# Patient Record
Sex: FEMALE | Race: WHITE | NOT HISPANIC OR LATINO | Employment: UNEMPLOYED | URBAN - METROPOLITAN AREA
[De-identification: names, ages, dates, MRNs, and addresses within clinical notes are randomized per-mention and may not be internally consistent; named-entity substitution may affect disease eponyms.]

---

## 2018-07-14 ENCOUNTER — APPOINTMENT (OUTPATIENT)
Dept: CT IMAGING | Facility: HOSPITAL | Age: 38
End: 2018-07-14

## 2018-07-14 ENCOUNTER — HOSPITAL ENCOUNTER (EMERGENCY)
Facility: HOSPITAL | Age: 38
Discharge: HOME | End: 2018-07-14
Attending: EMERGENCY MEDICINE | Admitting: EMERGENCY MEDICINE

## 2018-07-14 VITALS
BODY MASS INDEX: 19.12 KG/M2 | HEIGHT: 64 IN | DIASTOLIC BLOOD PRESSURE: 96 MMHG | SYSTOLIC BLOOD PRESSURE: 130 MMHG | OXYGEN SATURATION: 100 % | TEMPERATURE: 98 F | HEART RATE: 89 BPM | RESPIRATION RATE: 17 BRPM | WEIGHT: 112 LBS

## 2018-07-14 DIAGNOSIS — K61.0 PERIANAL CELLULITIS: Primary | ICD-10-CM

## 2018-07-14 LAB
ALBUMIN SERPL-MCNC: 4.3 G/DL (ref 3.5–5.2)
ALBUMIN/GLOB SERPL: 1.7 G/DL
ALP SERPL-CCNC: 96 U/L (ref 39–117)
ALT SERPL W P-5'-P-CCNC: 16 U/L (ref 1–33)
ANION GAP SERPL CALCULATED.3IONS-SCNC: 14.5 MMOL/L
AST SERPL-CCNC: 20 U/L (ref 1–32)
BACTERIA UR QL AUTO: ABNORMAL /HPF
BASOPHILS # BLD AUTO: 0.02 10*3/MM3 (ref 0–0.2)
BASOPHILS NFR BLD AUTO: 0.1 % (ref 0–1.5)
BILIRUB SERPL-MCNC: 0.5 MG/DL (ref 0.1–1.2)
BILIRUB UR QL STRIP: NEGATIVE
BUN BLD-MCNC: 8 MG/DL (ref 6–20)
BUN/CREAT SERPL: 10.5 (ref 7–25)
CALCIUM SPEC-SCNC: 9.4 MG/DL (ref 8.6–10.5)
CHLORIDE SERPL-SCNC: 103 MMOL/L (ref 98–107)
CLARITY UR: CLEAR
CO2 SERPL-SCNC: 25.5 MMOL/L (ref 22–29)
COLOR UR: ABNORMAL
CREAT BLD-MCNC: 0.76 MG/DL (ref 0.57–1)
D-LACTATE SERPL-SCNC: 0.7 MMOL/L (ref 0.5–2)
DEPRECATED RDW RBC AUTO: 42.8 FL (ref 37–54)
EOSINOPHIL # BLD AUTO: 0.07 10*3/MM3 (ref 0–0.7)
EOSINOPHIL NFR BLD AUTO: 0.5 % (ref 0.3–6.2)
ERYTHROCYTE [DISTWIDTH] IN BLOOD BY AUTOMATED COUNT: 12.7 % (ref 11.7–13)
GFR SERPL CREATININE-BSD FRML MDRD: 85 ML/MIN/1.73
GLOBULIN UR ELPH-MCNC: 2.5 GM/DL
GLUCOSE BLD-MCNC: 70 MG/DL (ref 65–99)
GLUCOSE UR STRIP-MCNC: NEGATIVE MG/DL
HCG SERPL QL: NEGATIVE
HCT VFR BLD AUTO: 41.1 % (ref 35.6–45.5)
HGB BLD-MCNC: 13.1 G/DL (ref 11.9–15.5)
HGB UR QL STRIP.AUTO: ABNORMAL
HYALINE CASTS UR QL AUTO: ABNORMAL /LPF
IMM GRANULOCYTES # BLD: 0.04 10*3/MM3 (ref 0–0.03)
IMM GRANULOCYTES NFR BLD: 0.3 % (ref 0–0.5)
KETONES UR QL STRIP: ABNORMAL
LEUKOCYTE ESTERASE UR QL STRIP.AUTO: NEGATIVE
LYMPHOCYTES # BLD AUTO: 1.56 10*3/MM3 (ref 0.9–4.8)
LYMPHOCYTES NFR BLD AUTO: 11 % (ref 19.6–45.3)
MCH RBC QN AUTO: 29.6 PG (ref 26.9–32)
MCHC RBC AUTO-ENTMCNC: 31.9 G/DL (ref 32.4–36.3)
MCV RBC AUTO: 93 FL (ref 80.5–98.2)
MONOCYTES # BLD AUTO: 0.97 10*3/MM3 (ref 0.2–1.2)
MONOCYTES NFR BLD AUTO: 6.8 % (ref 5–12)
NEUTROPHILS # BLD AUTO: 11.56 10*3/MM3 (ref 1.9–8.1)
NEUTROPHILS NFR BLD AUTO: 81.3 % (ref 42.7–76)
NITRITE UR QL STRIP: NEGATIVE
PH UR STRIP.AUTO: 6.5 [PH] (ref 5–8)
PLATELET # BLD AUTO: 213 10*3/MM3 (ref 140–500)
PMV BLD AUTO: 10.4 FL (ref 6–12)
POTASSIUM BLD-SCNC: 4.3 MMOL/L (ref 3.5–5.2)
PROT SERPL-MCNC: 6.8 G/DL (ref 6–8.5)
PROT UR QL STRIP: NEGATIVE
RBC # BLD AUTO: 4.42 10*6/MM3 (ref 3.9–5.2)
RBC # UR: ABNORMAL /HPF
REF LAB TEST METHOD: ABNORMAL
SODIUM BLD-SCNC: 143 MMOL/L (ref 136–145)
SP GR UR STRIP: 1.03 (ref 1–1.03)
SQUAMOUS #/AREA URNS HPF: ABNORMAL /HPF
UROBILINOGEN UR QL STRIP: ABNORMAL
WBC NRBC COR # BLD: 14.22 10*3/MM3 (ref 4.5–10.7)
WBC UR QL AUTO: ABNORMAL /HPF

## 2018-07-14 PROCEDURE — 25010000002 IOPAMIDOL 61 % SOLUTION: Performed by: EMERGENCY MEDICINE

## 2018-07-14 PROCEDURE — 96375 TX/PRO/DX INJ NEW DRUG ADDON: CPT

## 2018-07-14 PROCEDURE — 96365 THER/PROPH/DIAG IV INF INIT: CPT

## 2018-07-14 PROCEDURE — 25010000002 HYDROMORPHONE PER 4 MG: Performed by: EMERGENCY MEDICINE

## 2018-07-14 PROCEDURE — 87040 BLOOD CULTURE FOR BACTERIA: CPT | Performed by: EMERGENCY MEDICINE

## 2018-07-14 PROCEDURE — 81001 URINALYSIS AUTO W/SCOPE: CPT | Performed by: EMERGENCY MEDICINE

## 2018-07-14 PROCEDURE — 25010000002 PIPERACILLIN SOD-TAZOBACTAM PER 1 G: Performed by: EMERGENCY MEDICINE

## 2018-07-14 PROCEDURE — 99284 EMERGENCY DEPT VISIT MOD MDM: CPT

## 2018-07-14 PROCEDURE — 84703 CHORIONIC GONADOTROPIN ASSAY: CPT | Performed by: EMERGENCY MEDICINE

## 2018-07-14 PROCEDURE — 25010000002 ONDANSETRON PER 1 MG: Performed by: EMERGENCY MEDICINE

## 2018-07-14 PROCEDURE — 96361 HYDRATE IV INFUSION ADD-ON: CPT

## 2018-07-14 PROCEDURE — 74177 CT ABD & PELVIS W/CONTRAST: CPT

## 2018-07-14 PROCEDURE — 83605 ASSAY OF LACTIC ACID: CPT | Performed by: EMERGENCY MEDICINE

## 2018-07-14 PROCEDURE — 80053 COMPREHEN METABOLIC PANEL: CPT | Performed by: EMERGENCY MEDICINE

## 2018-07-14 PROCEDURE — 85025 COMPLETE CBC W/AUTO DIFF WBC: CPT | Performed by: EMERGENCY MEDICINE

## 2018-07-14 RX ORDER — ONDANSETRON 4 MG/1
4 TABLET, FILM COATED ORAL EVERY 6 HOURS PRN
Qty: 12 TABLET | Refills: 0 | Status: SHIPPED | OUTPATIENT
Start: 2018-07-14

## 2018-07-14 RX ORDER — SODIUM CHLORIDE 0.9 % (FLUSH) 0.9 %
10 SYRINGE (ML) INJECTION AS NEEDED
Status: DISCONTINUED | OUTPATIENT
Start: 2018-07-14 | End: 2018-07-14 | Stop reason: HOSPADM

## 2018-07-14 RX ORDER — CEPHALEXIN 500 MG/1
500 CAPSULE ORAL ONCE
Status: DISCONTINUED | OUTPATIENT
Start: 2018-07-14 | End: 2018-07-14

## 2018-07-14 RX ORDER — ONDANSETRON 2 MG/ML
4 INJECTION INTRAMUSCULAR; INTRAVENOUS ONCE
Status: COMPLETED | OUTPATIENT
Start: 2018-07-14 | End: 2018-07-14

## 2018-07-14 RX ORDER — AMOXICILLIN AND CLAVULANATE POTASSIUM 875; 125 MG/1; MG/1
1 TABLET, FILM COATED ORAL EVERY 12 HOURS
Qty: 14 TABLET | Refills: 0 | Status: SHIPPED | OUTPATIENT
Start: 2018-07-14

## 2018-07-14 RX ORDER — PRENATAL VIT NO.126/IRON/FOLIC 28MG-0.8MG
TABLET ORAL DAILY
COMMUNITY

## 2018-07-14 RX ORDER — HYDROCODONE BITARTRATE AND ACETAMINOPHEN 5; 325 MG/1; MG/1
1 TABLET ORAL EVERY 6 HOURS PRN
Qty: 12 TABLET | Refills: 0 | Status: SHIPPED | OUTPATIENT
Start: 2018-07-14

## 2018-07-14 RX ORDER — HYDROMORPHONE HYDROCHLORIDE 1 MG/ML
0.5 INJECTION, SOLUTION INTRAMUSCULAR; INTRAVENOUS; SUBCUTANEOUS ONCE
Status: COMPLETED | OUTPATIENT
Start: 2018-07-14 | End: 2018-07-14

## 2018-07-14 RX ORDER — METRONIDAZOLE 500 MG/1
500 TABLET ORAL ONCE
Status: DISCONTINUED | OUTPATIENT
Start: 2018-07-14 | End: 2018-07-14

## 2018-07-14 RX ORDER — SACCHAROMYCES BOULARDII 250 MG
250 CAPSULE ORAL 2 TIMES DAILY
COMMUNITY

## 2018-07-14 RX ADMIN — SODIUM CHLORIDE 1000 ML: 9 INJECTION, SOLUTION INTRAVENOUS at 16:01

## 2018-07-14 RX ADMIN — IOPAMIDOL 85 ML: 612 INJECTION, SOLUTION INTRAVENOUS at 17:28

## 2018-07-14 RX ADMIN — HYDROMORPHONE HYDROCHLORIDE 0.5 MG: 1 INJECTION, SOLUTION INTRAMUSCULAR; INTRAVENOUS; SUBCUTANEOUS at 15:56

## 2018-07-14 RX ADMIN — ONDANSETRON 4 MG: 2 INJECTION INTRAMUSCULAR; INTRAVENOUS at 15:51

## 2018-07-14 RX ADMIN — TAZOBACTAM SODIUM AND PIPERACILLIN SODIUM 4.5 G: 500; 4 INJECTION, SOLUTION INTRAVENOUS at 18:41

## 2018-07-14 NOTE — DISCHARGE INSTRUCTIONS
Take medications as prescribed.  Take a sitz bath several times daily.  Follow-up with Dr. Padilla in 2 days.  Return to the emergency Department for persistent fever, worsening pain, vomiting, or other concern.

## 2018-07-14 NOTE — ED PROVIDER NOTES
" EMERGENCY DEPARTMENT ENCOUNTER    CHIEF COMPLAINT  Chief Complaint: Rectal pain  History given by: Patient  History limited by: None  Room Number: 11/11  PMD: No Known Provider      HPI:  Pt is a 37 y.o. female who presents complaining of worsened \"throbbing\" rectal pain since 2 days ago. Pt also c/o flu like symptoms that began yesterday including fever, nausea, one episode of vomiting, decreased appetite, and chils, as well as SOA, slow urine output, and intermittent HA, but denies cough, dysuria, sore throat, and hematochezia. Pt took Naproxen this AM. Pt surgical hx includes appendectomy, breast biopsy, and D&C.     Duration:  Since 2 days ago  Onset: Gradual  Timing: Constant  Location: Rectum  Radiation: None  Quality: \"throbbing\" pain  Intensity/Severity: Moderate  Progression: Worsened  Associated Symptoms: Fever, nausea, one episode of vomiting, decreased appetite, chills, SOA, slow urine output, and intermittent HA  Previous Episodes: None  Treatment before arrival: Pt took Naproxen this AM.    PAST MEDICAL HISTORY  Active Ambulatory Problems     Diagnosis Date Noted   • No Active Ambulatory Problems     Resolved Ambulatory Problems     Diagnosis Date Noted   • No Resolved Ambulatory Problems     No Additional Past Medical History       PAST SURGICAL HISTORY  Past Surgical History:   Procedure Laterality Date   • APPENDECTOMY     • BREAST BIOPSY     • DILATATION AND CURETTAGE         FAMILY HISTORY  History reviewed. No pertinent family history.    SOCIAL HISTORY  Social History     Social History   • Marital status:      Spouse name: N/A   • Number of children: N/A   • Years of education: N/A     Occupational History   • Not on file.     Social History Main Topics   • Smoking status: Never Smoker   • Smokeless tobacco: Not on file   • Alcohol use Yes      Comment: occasionally   • Drug use: No   • Sexual activity: Yes     Partners: Male     Other Topics Concern   • Not on file     Social History " "Narrative   • No narrative on file       ALLERGIES  Patient has no known allergies.    REVIEW OF SYSTEMS  Review of Systems   Constitutional: Positive for appetite change (decreased), chills and fever.   HENT: Negative for sore throat.    Eyes: Negative.    Respiratory: Positive for shortness of breath. Negative for cough.    Cardiovascular: Negative for chest pain.   Gastrointestinal: Positive for nausea, rectal pain (worsened \"throbbing\" pain) and vomiting (one episode). Negative for abdominal pain, blood in stool and diarrhea.   Genitourinary: Positive for difficulty urinating (slow urine output). Negative for dysuria.   Musculoskeletal: Negative for neck pain.   Skin: Negative for rash.   Allergic/Immunologic: Negative.    Neurological: Positive for headaches (intermittent). Negative for weakness and numbness.   Hematological: Negative.    Psychiatric/Behavioral: Negative.    All other systems reviewed and are negative.      PHYSICAL EXAM  ED Triage Vitals [07/14/18 1331]   Temp Heart Rate Resp BP SpO2   99.9 °F (37.7 °C) 89 18 152/97 98 %      Temp src Heart Rate Source Patient Position BP Location FiO2 (%)   Tympanic Monitor -- -- --       Physical Exam   Constitutional: She is oriented to person, place, and time.  Non-toxic appearance. No distress.   HENT:   Head: Normocephalic and atraumatic.   Eyes: EOM are normal. Pupils are equal, round, and reactive to light.   Neck: Normal range of motion. Neck supple.   Cardiovascular: Normal rate, regular rhythm and normal heart sounds.    Pulmonary/Chest: Effort normal and breath sounds normal. No respiratory distress.   Abdominal: Soft. There is no tenderness. There is no rebound and no guarding.   Genitourinary: Rectal exam shows no external hemorrhoid and no internal hemorrhoid.   Genitourinary Comments: Upon rectal exam, pt has no hemorrhoids, tenderness between rectum and posterior perineum, and no fluctuance.   Musculoskeletal: Normal range of motion. She " exhibits no edema.   Neurological: She is alert and oriented to person, place, and time. She has normal sensation and normal strength.   Skin: Skin is warm and dry. No rash noted.   Psychiatric: Mood and affect normal.   Nursing note and vitals reviewed.      LAB RESULTS  Lab Results (last 24 hours)     Procedure Component Value Units Date/Time    CBC & Differential [[id number]] Collected:  07/14/18 1547    Specimen:  Blood Updated:  07/14/18 1620    Narrative:       The following orders were created for panel order CBC & Differential.  Procedure                               Abnormality         Status                     ---------                               -----------         ------                     CBC Auto Differential[[id number]]        Abnormal            Final result                 Please view results for these tests on the individual orders.    Comprehensive Metabolic Panel [[id number]] Collected:  07/14/18 1547    Specimen:  Blood Updated:  07/14/18 1635     Glucose 70 mg/dL      BUN 8 mg/dL      Creatinine 0.76 mg/dL      Sodium 143 mmol/L      Potassium 4.3 mmol/L      Chloride 103 mmol/L      CO2 25.5 mmol/L      Calcium 9.4 mg/dL      Total Protein 6.8 g/dL      Albumin 4.30 g/dL      ALT (SGPT) 16 U/L      AST (SGOT) 20 U/L      Alkaline Phosphatase 96 U/L      Total Bilirubin 0.5 mg/dL      eGFR Non African Amer 85 mL/min/1.73      Globulin 2.5 gm/dL      A/G Ratio 1.7 g/dL      BUN/Creatinine Ratio 10.5     Anion Gap 14.5 mmol/L     hCG, Serum, Qualitative [[id number]]  (Normal) Collected:  07/14/18 1547    Specimen:  Blood Updated:  07/14/18 1644     HCG Qualitative Negative    Lactic Acid, Plasma [[id number]]  (Normal) Collected:  07/14/18 1547    Specimen:  Blood Updated:  07/14/18 1632     Lactate 0.7 mmol/L     Blood Culture - Blood, [[id number]] Collected:  07/14/18 1547    Specimen:  Blood from Arm, Right Updated:  07/14/18 1606    CBC Auto Differential [[id number]]  (Abnormal) Collected:   07/14/18 1547    Specimen:  Blood Updated:  07/14/18 1620     WBC 14.22 (H) 10*3/mm3      RBC 4.42 10*6/mm3      Hemoglobin 13.1 g/dL      Hematocrit 41.1 %      MCV 93.0 fL      MCH 29.6 pg      MCHC 31.9 (L) g/dL      RDW 12.7 %      RDW-SD 42.8 fl      MPV 10.4 fL      Platelets 213 10*3/mm3      Neutrophil % 81.3 (H) %      Lymphocyte % 11.0 (L) %      Monocyte % 6.8 %      Eosinophil % 0.5 %      Basophil % 0.1 %      Immature Grans % 0.3 %      Neutrophils, Absolute 11.56 (H) 10*3/mm3      Lymphocytes, Absolute 1.56 10*3/mm3      Monocytes, Absolute 0.97 10*3/mm3      Eosinophils, Absolute 0.07 10*3/mm3      Basophils, Absolute 0.02 10*3/mm3      Immature Grans, Absolute 0.04 (H) 10*3/mm3     Blood Culture - Blood, [[id number]] Collected:  07/14/18 1548    Specimen:  Blood from Arm, Left Updated:  07/14/18 1606    Urinalysis With Microscopic If Indicated (No Culture) - Urine, Clean Catch [[id number]]  (Abnormal) Collected:  07/14/18 1748    Specimen:  Urine from Urine, Clean Catch Updated:  07/14/18 1804     Color, UA Dark Yellow (A)     Appearance, UA Clear     pH, UA 6.5     Specific Gravity, UA 1.026     Glucose, UA Negative     Ketones, UA 15 mg/dL (1+) (A)     Bilirubin, UA Negative     Blood, UA Trace (A)     Protein, UA Negative     Leuk Esterase, UA Negative     Nitrite, UA Negative     Urobilinogen, UA 0.2 E.U./dL    Urinalysis, Microscopic Only - Urine, Clean Catch [[id number]]  (Abnormal) Collected:  07/14/18 1748    Specimen:  Urine from Urine, Clean Catch Updated:  07/14/18 1807     RBC, UA 3-5 (A) /HPF      WBC, UA 0-2 /HPF      Bacteria, UA None Seen /HPF      Squamous Epithelial Cells, UA 0-2 /HPF      Hyaline Casts, UA None Seen /LPF      Methodology Automated Microscopy          I ordered the above labs and reviewed the results    RADIOLOGY  CT Abdomen Pelvis With Contrast   Preliminary Result   Minimal wall thickening and heterogeneous density of the   rectum could represent mild inflammatory  change and possible tiny   developing abscesses. Please correlate with the clinical findings. There   is some minimal haziness in the precoccygeal soft tissues which may be   inflammatory as well.       Radiation dose reduction techniques were utilized, including automated   exposure control and exposure modulation based on body size.                   I ordered the above noted radiological studies. Interpreted by radiologist. Discussed with radiologist (Dr. Guidry). Reviewed by me in PACS.       PROCEDURES  Procedures      PROGRESS AND CONSULTS   1519 Ordered protocol labs, UA, hCG, blood culture, lactic, and CT Abd/Pel for further evaluation. Ordered zofran for nausea, dilaudid for pain, and IVF for hydration.    1749 Rechecked with pt and discussed no abscess found in CT Abd/Pel and elevated WBC in labs. Plan to consult with Dr. Bridges prior to disposition.    1751 Placed call to Dr. Padilla, for pt consult.    1800 Discussed pt with Dr. Padilla, who states pt can either be discharged on Cephalexin, Flagyl, and pain medication until she can follow up in his office in 2 days or she can be admitted if she chooses so.    1807 Rechecked with pt and discussed options provided for pt by Dr. Padilla. Pt has requested to go home and f/u with Dr. Padilla in 2 days. Plan to discharge. Pt understands and agrees with the plan, all questions answered.    1812 Ordered estefany report for further evaluation.  ESTEFANY query complete. Treatment plan to include limited course of prescribed  controlled substance. Risks including addiction, benefits, and alternatives presented to patient.     1824 Pt will be give Augmentin instead of Flagyl as recommended by Dr. Padilla since pt is breast feeding currently. I have discussed this change with Dr. Padilla who also recommends a dose of Zosyn prior to pt being discharged today.    MEDICAL DECISION MAKING  Results were reviewed/discussed with the patient and they were also made aware of  online access. Pt also made aware that some labs, such as cultures, will not be resulted during ER visit and follow up with PMD is necessary.     MDM  Number of Diagnoses or Management Options  Perianal cellulitis:   Diagnosis management comments: On exam, patient had mild tenderness between her anus and perineum.  White blood cell count was elevated.  Temperature was 99.9.  CT scan showed some very tiny fluid collections adjacent to the anus with some minimal haziness.  There was no discrete abscess.  Case was discussed with Dr. Padilla.  Patient was offered admission but preferred to be treated as an outpatient.  She was given IV Dilaudid, Zofran, and IV Zosyn.  She'll be discharged with Augmentin, Zofran, and Norco.  She was advised to return to the emergency department for worsening symptoms, persistent fever, or other concern.  Dr. Padilla will see her in his office in 2 days for follow-up.       Amount and/or Complexity of Data Reviewed  Clinical lab tests: ordered and reviewed (WBC= 14.22)  Tests in the radiology section of CPT®: ordered and reviewed (CT Abd/Pel shows some tiny bubbled of fluid near the anal opening, but no obvious abscess.)  Decide to obtain previous medical records or to obtain history from someone other than the patient: yes    Patient Progress  Patient progress: stable         DIAGNOSIS  Final diagnoses:   Perianal cellulitis       DISPOSITION  DISCHARGE    Patient discharged in stable condition.    Reviewed implications of results, diagnosis, meds, responsibility to follow up, warning signs and symptoms of possible worsening, potential complications and reasons to return to ER, including new or worsening sxs.    Patient/Family voiced understanding of above instructions.    Discussed plan for discharge, as there is no emergent indication for admission. Patient referred to primary care provider for BP management due to today's BP. Pt/family is agreeable and understands need for follow up  and repeat testing.  Pt is aware that discharge does not mean that nothing is wrong but it indicates no emergency is present that requires admission and they must continue care with follow-up as given below or physician of their choice.     FOLLOW-UP  Giorgio Padilla MD  [address]  [address] [id number] [phone]    Go in 2 days           Medication List      New Prescriptions    amoxicillin-clavulanate 875-125 MG per tablet  Commonly known as:  AUGMENTIN  Take 1 tablet by mouth Every 12 (Twelve) Hours.     HYDROcodone-acetaminophen 5-325 MG per tablet  Commonly known as:  NORCO  Take 1 tablet by mouth Every 6 (Six) Hours As Needed for Moderate Pain .     ondansetron 4 MG tablet  Commonly known as:  ZOFRAN  Take 1 tablet by mouth Every 6 (Six) Hours As Needed for Nausea or   Vomiting.          Latest Documented Vital Signs:  As of 7:41 PM  BP- 130/96 HR- 89 Temp- 98 °F (36.7 °C) O2 sat- 100%    --  Documentation assistance provided by rishi Pitts for Dr. Villavicencio.  Information recorded by the scribe was done at my direction and has been verified and validated by me.     Kathy Pitts  07/14/18 8965       Romeo Villavicencio MD  07/14/18 2890

## 2018-07-16 ENCOUNTER — OFFICE VISIT (OUTPATIENT)
Dept: SURGERY | Facility: CLINIC | Age: 38
End: 2018-07-16

## 2018-07-16 VITALS — OXYGEN SATURATION: 98 % | BODY MASS INDEX: 21.24 KG/M2 | WEIGHT: 124.4 LBS | HEART RATE: 65 BPM | HEIGHT: 64 IN

## 2018-07-16 DIAGNOSIS — K62.89 ANAL PAIN: Primary | ICD-10-CM

## 2018-07-16 PROCEDURE — 99203 OFFICE O/P NEW LOW 30 MIN: CPT | Performed by: SURGERY

## 2018-07-16 NOTE — PROGRESS NOTES
SUMMARY (A/P):    37-year-old lady with episode of severe perianal pain that led to an emergency room visit.  Her pain has basically resolved at this point and there are no concerning findings on physical exam or CT scan.  I encouraged her to complete her course of antibiotics and follow-up of her symptoms recur.      CC:    Perianal pain    HPI:    37-year-old lady developed moderately severe perianal pain last Thursday associated with fever and chills.  Topical Preparation H did not help and she went to the emergency room on Saturday.  After her evaluation and workup there she was placed on Augmentin and referred for follow-up today.  She indicates that her pain has basically resolved.    PSH:    Appendectomy  Left breast biopsy    PMH:    Migraines    FAMILY HISTORY:    Negative for colorectal cancer in first-degree relatives  Colon cancer in maternal grandfather    SOCIAL HISTORY:   Denies tobacco use  Occasional alcohol use    ALLERGIES: reviewed, in Epic    MEDICATIONS: reviewed, in Epic    ROS:  No chest pain or shortness of air.  All other systems reviewed and negative other than presenting complaints.    RADIOLOGY/ENDOSCOPY:    CT abdomen pelvis 7/14/2018 demonstrated minimal wall thickening and heterogeneous density of the rectum which could represent mild inflammatory change and possibly tiny developing abscesses.  I reviewed the images and do not feel that there is any significant abnormality.    PHYSICAL EXAM:   Constitutional: Well-developed well-nourished, no acute distress  Vital signs: Heart rate 65, weight 124 pounds, height 64 inches, BMI 21.3  Eyes: Conjunctiva normal, sclera nonicteric  ENMT: Hearing grossly normal, oral mucosa moist  Neck: Supple  Respiratory: Clear to auscultation, normal inspiratory effort  Cardiovascular: Regular rate, no jugular venous distention  Gastrointestinal: Soft, nontender  Rectal: No external anal abnormalities, no visible or palpable abnormality, no  tenderness  Skin:  Warm, dry, no rash on visualized skin surfaces  Musculoskeletal: Symmetric strength, normal gait  Psychiatric: Alert and oriented ×3, normal affect     GLO ADAME M.D.

## 2018-07-19 LAB
BACTERIA SPEC AEROBE CULT: NORMAL
BACTERIA SPEC AEROBE CULT: NORMAL